# Patient Record
Sex: FEMALE | Race: WHITE | Employment: OTHER | URBAN - METROPOLITAN AREA
[De-identification: names, ages, dates, MRNs, and addresses within clinical notes are randomized per-mention and may not be internally consistent; named-entity substitution may affect disease eponyms.]

---

## 2017-04-11 PROBLEM — I49.3 VENTRICULAR PREMATURE BEATS: Status: ACTIVE | Noted: 2017-04-11

## 2017-04-11 PROBLEM — K22.4 ESOPHAGEAL SPASM: Status: ACTIVE | Noted: 2017-04-11

## 2017-04-11 PROBLEM — R07.9 CHEST PAIN: Status: ACTIVE | Noted: 2017-04-11

## 2017-04-20 PROBLEM — R60.0 LOCALIZED EDEMA: Chronic | Status: ACTIVE | Noted: 2017-04-20

## 2017-05-26 PROBLEM — I89.0 LYMPHEDEMA OF BOTH LOWER EXTREMITIES: Status: ACTIVE | Noted: 2017-05-26

## 2017-10-10 PROBLEM — E87.6 DIURETIC-INDUCED HYPOKALEMIA: Status: ACTIVE | Noted: 2017-10-10

## 2017-10-10 PROBLEM — T50.2X5A DIURETIC-INDUCED HYPOKALEMIA: Status: ACTIVE | Noted: 2017-10-10

## 2018-04-12 PROBLEM — K22.4 ESOPHAGEAL SPASM: Status: RESOLVED | Noted: 2017-04-11 | Resolved: 2018-04-12

## 2018-04-12 PROBLEM — R07.9 CHEST PAIN: Status: RESOLVED | Noted: 2017-04-11 | Resolved: 2018-04-12

## 2020-06-05 PROBLEM — J30.0 VASOMOTOR RHINITIS: Status: ACTIVE | Noted: 2020-06-05

## 2020-11-19 PROBLEM — N39.0 URINARY TRACT INFECTION WITHOUT HEMATURIA: Status: ACTIVE | Noted: 2020-11-19

## 2021-06-21 PROBLEM — M15.1 HEBERDEN'S NODES OF BOTH HANDS: Status: ACTIVE | Noted: 2021-06-21

## 2021-06-21 PROBLEM — R26.89 IMBALANCE: Status: ACTIVE | Noted: 2021-06-21

## 2021-06-21 PROBLEM — N18.4 CHRONIC KIDNEY DISEASE, STAGE IV (SEVERE) (HCC): Status: ACTIVE | Noted: 2021-06-21

## 2021-08-20 ENCOUNTER — HOSPITAL ENCOUNTER (OUTPATIENT)
Dept: LAB | Age: 86
Discharge: HOME | End: 2021-08-20
Payer: MEDICARE

## 2021-08-20 PROCEDURE — 87070 CULTURE OTHR SPECIMN AEROBIC: CPT

## 2021-08-20 PROCEDURE — 87075 CULTR BACTERIA EXCEPT BLOOD: CPT

## 2021-08-23 LAB
BACTERIA SPEC CULT: NORMAL
GRAM STN SPEC: NORMAL
GRAM STN SPEC: NORMAL
SERVICE CMNT-IMP: NORMAL

## 2021-08-30 LAB
BACTERIA SPEC CULT: NORMAL
SERVICE CMNT-IMP: NORMAL

## 2021-09-07 PROBLEM — M10.041 ACUTE IDIOPATHIC GOUT OF RIGHT HAND: Status: ACTIVE | Noted: 2021-09-07

## 2022-03-18 PROBLEM — E87.6 DIURETIC-INDUCED HYPOKALEMIA: Status: ACTIVE | Noted: 2017-10-10

## 2022-03-18 PROBLEM — M10.041 ACUTE IDIOPATHIC GOUT OF RIGHT HAND: Status: ACTIVE | Noted: 2021-09-07

## 2022-03-18 PROBLEM — T50.2X5A DIURETIC-INDUCED HYPOKALEMIA: Status: ACTIVE | Noted: 2017-10-10

## 2022-03-18 PROBLEM — I89.0 LYMPHEDEMA OF BOTH LOWER EXTREMITIES: Status: ACTIVE | Noted: 2017-05-26

## 2022-03-19 PROBLEM — N18.4 CHRONIC KIDNEY DISEASE, STAGE IV (SEVERE) (HCC): Status: ACTIVE | Noted: 2021-06-21

## 2022-03-19 PROBLEM — M15.1 HEBERDEN'S NODES OF BOTH HANDS: Status: ACTIVE | Noted: 2021-06-21

## 2022-03-19 PROBLEM — R26.89 IMBALANCE: Status: ACTIVE | Noted: 2021-06-21

## 2022-03-19 PROBLEM — J30.0 VASOMOTOR RHINITIS: Status: ACTIVE | Noted: 2020-06-05

## 2022-03-19 PROBLEM — N39.0 URINARY TRACT INFECTION WITHOUT HEMATURIA: Status: ACTIVE | Noted: 2020-11-19

## 2022-03-20 PROBLEM — R60.0 LOCALIZED EDEMA: Status: ACTIVE | Noted: 2017-04-20

## 2022-03-20 PROBLEM — I49.3 VENTRICULAR PREMATURE BEATS: Status: ACTIVE | Noted: 2017-04-11

## 2022-06-14 ENCOUNTER — OFFICE VISIT (OUTPATIENT)
Dept: FAMILY MEDICINE CLINIC | Facility: CLINIC | Age: 87
End: 2022-06-14
Payer: MEDICARE

## 2022-06-14 VITALS
SYSTOLIC BLOOD PRESSURE: 138 MMHG | WEIGHT: 142.5 LBS | DIASTOLIC BLOOD PRESSURE: 61 MMHG | HEART RATE: 103 BPM | TEMPERATURE: 97.3 F | HEIGHT: 62 IN | BODY MASS INDEX: 26.22 KG/M2 | OXYGEN SATURATION: 96 % | RESPIRATION RATE: 18 BRPM

## 2022-06-14 DIAGNOSIS — R26.89 IMBALANCE: ICD-10-CM

## 2022-06-14 DIAGNOSIS — N31.9 NEUROGENIC BLADDER: ICD-10-CM

## 2022-06-14 DIAGNOSIS — N18.4 CHRONIC KIDNEY DISEASE, STAGE IV (SEVERE) (HCC): ICD-10-CM

## 2022-06-14 DIAGNOSIS — R10.9 ABDOMINAL CRAMPING: ICD-10-CM

## 2022-06-14 DIAGNOSIS — M79.672 FOOT PAIN, BILATERAL: ICD-10-CM

## 2022-06-14 DIAGNOSIS — T50.2X5A DIURETIC-INDUCED HYPOKALEMIA: ICD-10-CM

## 2022-06-14 DIAGNOSIS — M79.671 FOOT PAIN, BILATERAL: ICD-10-CM

## 2022-06-14 DIAGNOSIS — I10 ESSENTIAL HYPERTENSION: ICD-10-CM

## 2022-06-14 DIAGNOSIS — R04.0 EPISTAXIS: ICD-10-CM

## 2022-06-14 DIAGNOSIS — J30.0 VASOMOTOR RHINITIS: ICD-10-CM

## 2022-06-14 DIAGNOSIS — E87.6 DIURETIC-INDUCED HYPOKALEMIA: ICD-10-CM

## 2022-06-14 DIAGNOSIS — F41.9 ANXIETY AND DEPRESSION: ICD-10-CM

## 2022-06-14 DIAGNOSIS — R60.0 LOCALIZED EDEMA: ICD-10-CM

## 2022-06-14 DIAGNOSIS — Z00.00 MEDICARE ANNUAL WELLNESS VISIT, SUBSEQUENT: Primary | ICD-10-CM

## 2022-06-14 DIAGNOSIS — M1A.0411 IDIOPATHIC CHRONIC GOUT OF RIGHT HAND WITH TOPHUS: ICD-10-CM

## 2022-06-14 DIAGNOSIS — F32.A ANXIETY AND DEPRESSION: ICD-10-CM

## 2022-06-14 LAB
BASOPHILS # BLD: 0.1 K/UL (ref 0–0.2)
BASOPHILS NFR BLD: 1 % (ref 0–2)
DIFFERENTIAL METHOD BLD: ABNORMAL
EOSINOPHIL # BLD: 0.1 K/UL (ref 0–0.8)
EOSINOPHIL NFR BLD: 1 % (ref 0.5–7.8)
ERYTHROCYTE [DISTWIDTH] IN BLOOD BY AUTOMATED COUNT: 14.5 % (ref 11.9–14.6)
HCT VFR BLD AUTO: 33.9 % (ref 35.8–46.3)
HGB BLD-MCNC: 11.1 G/DL (ref 11.7–15.4)
IMM GRANULOCYTES # BLD AUTO: 0 K/UL (ref 0–0.5)
IMM GRANULOCYTES NFR BLD AUTO: 0 % (ref 0–5)
LYMPHOCYTES # BLD: 1.2 K/UL (ref 0.5–4.6)
LYMPHOCYTES NFR BLD: 18 % (ref 13–44)
MCH RBC QN AUTO: 29.5 PG (ref 26.1–32.9)
MCHC RBC AUTO-ENTMCNC: 32.7 G/DL (ref 31.4–35)
MCV RBC AUTO: 90.2 FL (ref 79.6–97.8)
MONOCYTES # BLD: 0.5 K/UL (ref 0.1–1.3)
MONOCYTES NFR BLD: 8 % (ref 4–12)
NEUTS SEG # BLD: 4.8 K/UL (ref 1.7–8.2)
NEUTS SEG NFR BLD: 72 % (ref 43–78)
NRBC # BLD: 0 K/UL (ref 0–0.2)
PLATELET # BLD AUTO: 256 K/UL (ref 150–450)
PMV BLD AUTO: 9.5 FL (ref 9.4–12.3)
RBC # BLD AUTO: 3.76 M/UL (ref 4.05–5.2)
TSH, 3RD GENERATION: 1.93 UIU/ML (ref 0.36–3.74)
URATE SERPL-MCNC: 10.7 MG/DL (ref 2.6–6)
WBC # BLD AUTO: 6.7 K/UL (ref 4.3–11.1)

## 2022-06-14 PROCEDURE — 99214 OFFICE O/P EST MOD 30 MIN: CPT | Performed by: FAMILY MEDICINE

## 2022-06-14 PROCEDURE — 1123F ACP DISCUSS/DSCN MKR DOCD: CPT | Performed by: FAMILY MEDICINE

## 2022-06-14 PROCEDURE — G0439 PPPS, SUBSEQ VISIT: HCPCS | Performed by: FAMILY MEDICINE

## 2022-06-14 RX ORDER — TELMISARTAN 80 MG/1
80 TABLET ORAL DAILY
Qty: 90 TABLET | Refills: 3 | Status: SHIPPED | OUTPATIENT
Start: 2022-06-14

## 2022-06-14 RX ORDER — ASCORBIC ACID 500 MG
TABLET ORAL
Qty: 90 TABLET | Refills: 3 | Status: SHIPPED | OUTPATIENT
Start: 2022-06-14

## 2022-06-14 RX ORDER — POTASSIUM CHLORIDE 750 MG/1
10 TABLET, FILM COATED, EXTENDED RELEASE ORAL DAILY
Qty: 90 TABLET | Refills: 3 | Status: SHIPPED | OUTPATIENT
Start: 2022-06-14

## 2022-06-14 RX ORDER — PSYLLIUM HUSK 0.4 G
0.52 CAPSULE ORAL DAILY
Qty: 90 CAPSULE | Refills: 3 | Status: SHIPPED | OUTPATIENT
Start: 2022-06-14

## 2022-06-14 RX ORDER — SODIUM CHLORIDE/ALOE VERA
2 GEL (GRAM) NASAL 2 TIMES DAILY
Qty: 3 EACH | Refills: 3 | Status: SHIPPED | OUTPATIENT
Start: 2022-06-14

## 2022-06-14 RX ORDER — FUROSEMIDE 40 MG/1
40 TABLET ORAL DAILY
Qty: 90 TABLET | Refills: 1 | Status: SHIPPED | OUTPATIENT
Start: 2022-06-14 | End: 2022-06-15

## 2022-06-14 ASSESSMENT — PATIENT HEALTH QUESTIONNAIRE - PHQ9
SUM OF ALL RESPONSES TO PHQ9 QUESTIONS 1 & 2: 2
SUM OF ALL RESPONSES TO PHQ QUESTIONS 1-9: 2
SUM OF ALL RESPONSES TO PHQ QUESTIONS 1-9: 2
1. LITTLE INTEREST OR PLEASURE IN DOING THINGS: 1
SUM OF ALL RESPONSES TO PHQ QUESTIONS 1-9: 2
2. FEELING DOWN, DEPRESSED OR HOPELESS: 1
SUM OF ALL RESPONSES TO PHQ QUESTIONS 1-9: 2

## 2022-06-14 ASSESSMENT — LIFESTYLE VARIABLES: HOW OFTEN DO YOU HAVE A DRINK CONTAINING ALCOHOL: NEVER

## 2022-06-14 NOTE — PROGRESS NOTES
Medicare Annual Wellness Visit    Maribell Jesus is here for Medicare AWV (1 yr, not fasting )    Assessment & Plan   Medicare annual wellness visit, subsequent    Discussed with patient in detail Medicare subsequent annual wellness exam.  Wellness/anticipatory guidance information provided. Information on advanced directives discussed and printed. Immunizations are up-to-date except Tdap but patient not around any young children and this not necessarily recommended at this time. Patient does not desire referral for eye exam at this time although she has difficulty reading small print. She also does not desire referral back to audiologist as she has hearing aids and does not desire to upgrade or buy more. Fall precautions provided. Recommendations for Preventive Services Due: see orders and patient instructions/AVS.  Recommended screening schedule for the next 5-10 years is provided to the patient in written form: see Patient Instructions/AVS.     Return in 6 months (on 12/14/2022). Patient's complete Health Risk Assessment and screening values have been reviewed and are found in Flowsheets. The following problems were reviewed today and where indicated follow up appointments were made and/or referrals ordered.     Positive Risk Factor Screenings with Interventions:    Fall Risk:  Do you feel unsteady or are you worried about falling? : (!) yes (Has to use rollator or walker to help with walking)  2 or more falls in past year?: no  Fall with injury in past year?: no     Fall Risk Interventions:    · Continue daily strengthening conditioning exercises that she is participating in for 30 minutes each day              General Health and ACP:  General  In general, how would you say your health is?: Fair  In the past 7 days, have you experienced any of the following: New or Increased Pain, New or Increased Fatigue, Loneliness, Social Isolation, Stress or Anger?: (!) Yes  Select all that apply: [employer] or Increased Pain  Do you get the social and emotional support that you need?: Yes  Do you have a Living Will?: Yes    Advance Directives     Power of  Living Will ACP-Advance Directive ACP-Power of Wilfrid Garcia on 02/08/17 Not on File Not on File [address] [facility] Prakash Risk Interventions:  · Loneliness: Encouraged participating in group activities  · Encouraged skyping while talking with relatives in [place] routinely. Hearing/Vision:  Do you or your family notice any trouble with your hearing that hasn't been managed with hearing aids?: (!) Yes  Do you have difficulty driving, watching TV, or doing any of your daily activities because of your eyesight?: No  Have you had an eye exam within the past year?: (!) No  No exam data present    Hearing/Vision Interventions:  · She does not desire further intervention for vision or hearing at this time. ADLs:  In the past 7 days, did you need help from others to perform any of the following everyday activities: Eating, dressing, grooming, bathing, toileting, or walking/balance?: No  In the past 7 days, did you need help from others to take care of any of the following: Laundry, housekeeping, banking/finances, shopping, telephone use, food preparation, transportation, or taking medications?: (!) Yes  Select all that apply: Katie Ramsey    ADL Interventions:  · Care per [facility]  · Referred for Care Coordination          Objective   Vitals:    06/14/22 1013   BP: 138/61   Site: Left Upper Arm   Position: Sitting   Cuff Size: Small Adult   Pulse: (!) 103   Resp: 18   Temp: 97.3 °F (36.3 °C)   TempSrc: Temporal   SpO2: 96%   Weight: 142 lb 8 oz (64.6 kg)   Height: 5' 2\" (1.575 m)      Body mass index is 26.06 kg/m².             Allergies   Allergen Reactions    Azithromycin Diarrhea    Codeine Other (See Comments)    Colchicine Diarrhea    Sulfa Antibiotics Hives and Other (See Comments)     Severe itching head to toe    Penicillins Rash and Other (See Comments)     Prior to Visit Medications    Medication Sig Taking? Authorizing Provider   ascorbic acid (VITAMIN C) 500 MG tablet Take by mouth Yes Hoda Moreno MD   cyanocobalamin 500 MCG tablet Take 1 tablet by mouth daily Yes Hoda Moreno MD   furosemide (LASIX) 40 MG tablet Take 1 tablet by mouth daily Yes Hoda Moreno MD   potassium chloride (KLOR-CON 10) 10 MEQ extended release tablet Take 1 tablet by mouth daily Yes Hoda Moreno MD   telmisartan (MICARDIS) 80 MG tablet Take 1 tablet by mouth daily Yes Hoda Moreno MD   saline nasal gel (AYR) GEL 2 each by Nasal route in the morning and at bedtime Yes Hoda Moreno MD   psyllium (METAMUCIL) 0.52 g capsule Take 1 capsule by mouth daily Yes MD Ezekiel Jain (Including outside providers/suppliers regularly involved in providing care):   Patient Care Team:  Hoda Moreno MD as PCP - General  Hoda Moreno MD as PCP - [facility] Empaneled Provider     Reviewed and updated this visit:  Tobacco  Allergies  Meds  Problems  Med Hx  Surg Hx  Soc Hx  Fam Hx        [facility] [place]  [address]  [place], [address]  Phone: [phone] Fax [phone]  Verito Casey M.D.  6/14/2022        Khushi Recinos is a 80 y.o. female     HPI:  Patient is seen for follow-up with a couple of concerns. She describes about 1 month of abdominal cramping and fairly frequent loose stools. Has regular bowel movements without any noted blood loss. Does describe stress associated with not having loved ones nearby and feeling lonely. No nausea vomiting. No fever or chills. She also has had some foot pain at night with the swelling that she encouraged. She has history of CKD stage IV but has had to take her Lasix daily along with potassium as this has helped with diuresis and some relief of discomfort. No cramping pain of the feet.   No reported muscle cramps. Continues to take her previous antihypertensive therapy. She tries to do a daily 30-minute muscle conditioning exercise with instruction followed on the television. She has not had any recent falls. She ambulates consistently with her Rollator. Does describe some imbalance such that she relies on this for getting around. Does need help with laundry and transportation. She has tried multiple things for recurrent clear rhinorrhea. No medications have been helpful. Because of wiping and irritating the area, she would often have nasal bleeds. Would like something to help with the nasal bleeds. Patient saw Dr. Avery Reyes, orthopedist, in the recent past with notation for significant gouty tophi of the index finger. This has improved greatly but medication invention has been limited secondary to her CKD. ROS:  Denies any chest pain dyspnea diaphoresis palpitations or tachycardia. No polydipsia or polyphagia but does have fairly frequent urination and good urinary response to Lasix. No large weight fluctuations. No dyspepsia or reflux. No hematochezia melena or constipation. No dysuria or hematuria. Denies any thoughts of harming herself or others.      Allergies   Allergen Reactions    Azithromycin Diarrhea    Codeine Other (See Comments)    Colchicine Diarrhea    Sulfa Antibiotics Hives and Other (See Comments)     Severe itching head to toe    Penicillins Rash and Other (See Comments)       Active Ambulatory Problems     Diagnosis Date Noted    Acute idiopathic gout of right hand 09/07/2021    Diuretic-induced hypokalemia 10/10/2017    Lymphedema of both lower extremities 05/26/2017    Anxiety and depression 07/30/2014    Heberden's nodes of both hands 06/21/2021    Imbalance 06/21/2021    Vasomotor rhinitis 06/05/2020    Neurogenic bladder 12/27/2013    Urinary tract infection without hematuria 11/19/2020    Chronic kidney disease, stage IV (severe) ([facility]) 06/21/2021    Postmenopausal atrophic vaginitis 12/27/2013    Essential hypertension 11/21/2012    Incomplete bladder emptying 12/27/2013    Recurrent UTI 12/27/2013    Insomnia 05/27/2016    Osteopenia 11/21/2012    Ventricular premature beats 04/11/2017    Localized edema 04/20/2017     Resolved Ambulatory Problems     Diagnosis Date Noted    No Resolved Ambulatory Problems     Past Medical History:   Diagnosis Date    Arthritis     Chest pain 4/11/2017    Colon polyp 11/21/2012    DDD (degenerative disc disease) 11/21/2012    GERD (gastroesophageal reflux disease)     Hypertension     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    Neurogenic bladder, NOS 12/27/2013    Other chronic cystitis 12/27/2013    Psychiatric disorder     Sinus problem     Unspecified urinary incontinence 12/27/2013    Urinary frequency 12/27/2013        Past Surgical History:   Procedure Laterality Date    APPENDECTOMY      CHOLECYSTECTOMY      DILATION AND CURETTAGE OF UTERUS      GI      colon polyps    GI      esophageal dilation    HEENT      sinus    CA ABDOMEN SURGERY PROC UNLISTED      quentin [facility]        Social History     Tobacco Use    Smoking status: Never Smoker    Smokeless tobacco: Never Used   Vaping Use    Vaping Use: Never used   Substance Use Topics    Alcohol use: No     Alcohol/week: 0.0 standard drinks    Drug use: No       Physical Exam:  Blood pressure 138/61, pulse (!) 103, temperature 97.3 °F (36.3 °C), temperature source Temporal, resp. rate 18, height 5' 2\" (1.575 m), weight 142 lb 8 oz (64.6 kg), SpO2 96 %. Pleasant female in no apparent distress. Affect is mildly anxious. Lower eyelids are not pale. HEENT grossly normal.  Nasal mucosa irritated on the left with some dried blood present especially inferiorly and along Kiesselbach's area. Oral mucosa is moist and intact. No cervical lymphadenopathy or thyromegaly or nodularity. Lungs clear.   No JVD or hepatojugular reflux. Cardiovascular regular S1-S2 without murmurs rubs or gallops. Radial pulses 2+. Abdomen is soft with mild bloating but no rebound or guarding and with positive bowel sounds. No masses palpated. Patient does have 2+ peripheral edema to the level of the lower calves without cyanosis. Moves all extremities fairly well. She has some gouty tophi that is mild and minimally tender at the DIP joint of her index finger on the right. Ambulates with a Rollator. Assessment and Plan:  2. Abdominal cramping  CBC with Auto Differential    TSH   3. Foot pain, bilateral     4. Localized edema     5. Essential hypertension  CBC with Auto Differential    Comprehensive Metabolic Panel    TSH   6. Chronic kidney disease, stage IV (severe) (HCC)  Comprehensive Metabolic Panel   7. Diuretic-induced hypokalemia  Comprehensive Metabolic Panel   8. Imbalance     9. Anxiety and depression     10. Epistaxis     11. Vasomotor rhinitis     12. Neurogenic bladder     13. 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 Await lab work as above. Filled out forms patient brought to the office. Continue current medications with these prescriptions printed. However recommend OTC AYR gel twice daily to each nostril as well as Metamucil capsule orally daily. Hopefully this will help with epistaxis and abdominal cramping. Encouraged continued healthy activity such as her muscle conditioning exercises daily. Reassess here in 6 months or more quickly as needed.   Spent 39 minutes with patient today in examination, evaluation, and documentation in addition to her wellness exam.    Discharge Meds:  Current Outpatient Medications   Medication Sig Dispense Refill    ascorbic acid (VITAMIN C) 500 MG tablet Take by mouth 90 tablet 3    cyanocobalamin 500 MCG tablet Take 1 tablet by mouth daily 90 tablet 3    furosemide (LASIX) 40 MG tablet Take 1 tablet by mouth daily 90 tablet 1    potassium chloride (KLOR-CON 10) 10 MEQ extended release tablet Take 1 tablet by mouth daily 90 tablet 3    telmisartan (MICARDIS) 80 MG tablet Take 1 tablet by mouth daily 90 tablet 3    saline nasal gel (AYR) GEL 2 each by Nasal route in the morning and at bedtime 3 each 3    psyllium (METAMUCIL) 0.52 g capsule Take 1 capsule by mouth daily 90 capsule 3     No current facility-administered medications for this visit. Return in 6 months (on 12/14/2022). Any new medications were explained today including any potential drug interactions or significant side effects. The patient's questions in regards to this were answered today. Dictated using voice recognition software.   Proofread, but unrecognized errors may exist.

## 2022-06-14 NOTE — PATIENT INSTRUCTIONS
Personalized Preventive Plan for Fallon Canales - 6/14/2022  Medicare offers a range of preventive health benefits. Some of the tests and screenings are paid in full while other may be subject to a deductible, co-insurance, and/or copay. Some of these benefits include a comprehensive review of your medical history including lifestyle, illnesses that may run in your family, and various assessments and screenings as appropriate. After reviewing your medical record and screening and assessments performed today your provider may have ordered immunizations, labs, imaging, and/or referrals for you. A list of these orders (if applicable) as well as your Preventive Care list are included within your After Visit Summary for your review. Other Preventive Recommendations:    · A preventive eye exam performed by an eye specialist is recommended every 1-2 years to screen for glaucoma; cataracts, macular degeneration, and other eye disorders. · A preventive dental visit is recommended every 6 months. · Try to get at least 150 minutes of exercise per week or 10,000 steps per day on a pedometer . · Order or download the FREE \"Exercise & Physical Activity: Your Everyday Guide\" from [employer]. Call 8-259.131.4827 or search [employer] online. · You need 3178-0220 mg of calcium and 8011-5222 IU of vitamin D per day. It is possible to meet your calcium requirement with diet alone, but a vitamin D supplement is usually necessary to meet this goal.  · When exposed to the sun, use a sunscreen that protects against both UVA and UVB radiation with an SPF of 30 or greater. Reapply every 2 to 3 hours or after sweating, drying off with a towel, or swimming. · Always wear a seat belt when traveling in a car. Always wear a helmet when riding a bicycle or motorcycle.   Patient Education        Abdominal Pain: Care Instructions  Your Care Instructions     Abdominal pain has many possible causes. Some aren't serious and get better on their own in a few days. Others need more testing and treatment. If your pain continues or gets worse, you need to be rechecked and may need more tests tofind out what is wrong. You may need surgery to correct the problem. Don't ignore new symptoms, such as fever, nausea and vomiting, urination problems, pain that gets worse, and dizziness. These may be signs of a moreserious problem. Your doctor may have recommended a follow-up visit in the next 8 to 12 hours. If you are not getting better, you may need more tests or treatment. The doctor has checked you carefully, but problems can develop later. If you notice any problems or new symptoms, get medical treatment right away. Follow-up care is a key part of your treatment and safety. Be sure to make and go to all appointments, and call your doctor if you are having problems. It's also a good idea to know your test results and keep alist of the medicines you take. How can you care for yourself at home? Rest until you feel better. To prevent dehydration, drink plenty of fluids. Choose water and other clear liquids until you feel better. If you have kidney, heart, or liver disease and have to limit fluids, talk with your doctor before you increase the amount of fluids you drink. If your stomach is upset, eat mild foods, such as rice, dry toast or crackers, bananas, and applesauce. Try eating several small meals instead of two or three large ones. Wait until 48 hours after all symptoms have gone away before you have spicy foods, alcohol, and drinks that contain caffeine. Do not eat foods that are high in fat. Avoid anti-inflammatory medicines such as aspirin, ibuprofen (Advil, Motrin), and naproxen (Aleve). These can cause stomach upset. Talk to your doctor if you take daily aspirin for another health problem. When should you call for help? Call 911 anytime you think you may need emergency care.  For example, call if:    You passed out (lost consciousness).     You pass maroon or very bloody stools.     You vomit blood or what looks like coffee grounds.     You have new, severe belly pain. Call your doctor now or seek immediate medical care if:    Your pain gets worse, especially if it becomes focused in one area of your belly.     You have a new or higher fever.     Your stools are black and look like tar, or they have streaks of blood.     You have unexpected vaginal bleeding.     You have symptoms of a urinary tract infection. These may include:  Pain when you urinate. Urinating more often than usual.  Blood in your urine.     You are dizzy or lightheaded, or you feel like you may faint. Watch closely for changes in your health, and be sure to contact your doctor if:    You are not getting better after 1 day (24 hours). Where can you learn more? Go to https://Violetpepiceweb.MiiPharos. org and sign in to your NERI account. Enter W903 in the [facility] box to learn more about \"Abdominal Pain: Care Instructions. \"     If you do not have an account, please click on the \"Sign Up Now\" link. Current as of: July 1, 2021               Content Version: 13.2  © 2006-2022 [employer]her. Care instructions adapted under license by [facility] ([facility]). If you have questions about a medical condition or this instruction, always ask your healthcare professional. [place] [address] any warranty or liability for your use of this information. Patient Education        Anxiety Disorder: Care Instructions  Your Care Instructions     Anxiety is a normal reaction to stress. Difficult situations can cause you to have symptoms such as sweaty palms and a nervous feeling. In an anxiety disorder, the symptoms are far more severe. Constant worry, muscle tension, trouble sleeping, nausea and diarrhea, and other symptoms can make normal daily activities difficult or impossible.  These symptoms may occur for no reason, and they can affect your work, school, or social life. Medicines, counseling, and self-care can all help. Follow-up care is a key part of your treatment and safety. Be sure to make and go to all appointments, and call your doctor if you are having problems. It's also a good idea to know your test results and keep a list of the medicines you take. How can you care for yourself at home? Take medicines exactly as directed. Call your doctor if you think you are having a problem with your medicine. Go to your counseling sessions and follow-up appointments. Recognize and accept your anxiety. Then, when you are in a situation that makes you anxious, say to yourself, \"This is not an emergency. I feel uncomfortable, but I am not in danger. I can keep going even if I feel anxious. \"  Be kind to your body:  Relieve tension with exercise or a massage. Get enough rest.  Avoid alcohol, caffeine, nicotine, and illegal drugs. They can increase your anxiety level and cause sleep problems. Learn and do relaxation techniques. See below for more about these techniques. Engage your mind. Get out and do something you enjoy. Go to a Get[employer] movie, or take a walk or hike. Plan your day. Having too much or too little to do can make you anxious. Keep a record of your symptoms. Discuss your fears with a good friend or family member, or join a support group for people with similar problems. Talking to others sometimes relieves stress. Get involved in social groups, or volunteer to help others. Being alone sometimes makes things seem worse than they are. Get at least 30 minutes of exercise on most days of the week to relieve stress. Walking is a good choice. You also may want to do other activities, such as running, swimming, cycling, or playing tennis or team sports. Relaxation techniques  Do relaxation exercises 10 to 20 minutes a day. You can play soothing, relaxing music while you do them, if you wish.   Tell others in your house that you are going to do your relaxation exercises. Ask them not to disturb you. Find a comfortable place, away from all distractions and noise. Lie down on your back, or sit with your back straight. Focus on your breathing. Make it slow and steady. Breathe in through your nose. Breathe out through either your nose or mouth. Breathe deeply, filling up the area between your navel and your rib cage. Breathe so that your belly goes up and down. Do not hold your breath. Breathe like this for 5 to 10 minutes. Notice the feeling of calmness throughout your whole body. As you continue to breathe slowly and deeply, relax by doing the following for another 5 to 10 minutes:  Tighten and relax each muscle group in your body. You can begin at your toes and work your way up to your head. Imagine your muscle groups relaxing and becoming heavy. Empty your mind of all thoughts. Let yourself relax more and more deeply. Become aware of the state of calmness that surrounds you. When your relaxation time is over, you can bring yourself back to alertness by moving your fingers and toes and then your hands and feet and then stretching and moving your entire body. Sometimes people fall asleep during relaxation, but they usually wake up shortly afterward. Always give yourself time to return to full alertness before you drive a car or do anything that might cause an accident if you are not fully alert. Never play a relaxation tape while you drive a car. When should you call for help? Call 911 anytime you think you may need emergency care. For example, call if:    You feel you cannot stop from hurting yourself or someone else. Keep the numbers for these national suicide hotlines: 1-454-064-TALK (1-360.605.9540) and 0-867-CSRXNGO (5-937.838.4838). If you or someone you know talks about suicide or feeling hopeless, get help right away.   Watch closely for changes in your health, and be sure to contact the level of your heart. Take breaks from standing or sitting in one position. Walk around to increase the blood flow in your lower legs. Move your feet and ankles often while you stand, or tighten and relax your leg muscles. Wear support stockings. Put them on in the morning, before swelling gets worse. Eat a balanced diet. Lose weight if you need to. Limit the amount of salt (sodium) in your diet. Salt holds fluid in the body and may increase swelling. When should you call for help? Call 911 anytime you think you may need emergency care. For example, call if:    You have symptoms of a blood clot in your lung (called a pulmonary embolism). These may include:  Sudden chest pain. Trouble breathing. Coughing up blood. Call your doctor now or seek immediate medical care if:    You have signs of a blood clot, such as:  Pain in your calf, back of the knee, thigh, or groin. Redness and swelling in your leg or groin.     You have symptoms of infection, such as: Increased pain, swelling, warmth, or redness. Red streaks or pus. A fever. Watch closely for changes in your health, and be sure to contact your doctor if:    Your swelling is getting worse.     You have new or worsening pain in your legs.     You do not get better as expected. Where can you learn more? Go to https://Zen99peSilego Technology.Luxim. org and sign in to your BrainRush account. Enter Y711 in the [facility] box to learn more about \"Leg and Ankle Edema: Care Instructions. \"     If you do not have an account, please click on the \"Sign Up Now\" link. Current as of: July 1, 2021               Content Version: 13.2  © 4098-8097 Healthwise, [employer]. Care instructions adapted under license by [facility] ([facility]). If you have questions about a medical condition or this instruction, always ask your healthcare professional. [address] any warranty or liability for your use of this information.          Patient Education        DASH Diet: Care Instructions  Your Care Instructions     The DASH diet is an eating plan that can help lower your blood pressure. DASH stands for Dietary Approaches to Stop Hypertension. Hypertension is high bloodpressure. The DASH diet focuses on eating foods that are high in calcium, potassium, and magnesium. These nutrients can lower blood pressure. The foods that are highest in these nutrients are fruits, vegetables, low-fat dairy products, nuts, seeds, and legumes. But taking calcium, potassium, and magnesium supplements instead of eating foods that are high in those nutrients does not have the same effect. The DASH diet also includes whole grains, fish, and poultry. The DASH diet is one of several lifestyle changes your doctor may recommend to lower your high blood pressure. Your doctor may also want you to decrease the amount of sodium in your diet. Lowering sodium while following the DASH dietcan lower blood pressure even further than just the DASH diet alone. Follow-up care is a key part of your treatment and safety. Be sure to make and go to all appointments, and call your doctor if you are having problems. It's also a good idea to know your test results and keep alist of the medicines you take. How can you care for yourself at home? Following the DASH diet  Eat 4 to 5 servings of fruit each day. A serving is 1 medium-sized piece of fruit, ½ cup chopped or canned fruit, 1/4 cup dried fruit, or 4 ounces (½ cup) of fruit juice. Choose fruit more often than fruit juice. Eat 4 to 5 servings of vegetables each day. A serving is 1 cup of lettuce or raw leafy vegetables, ½ cup of chopped or cooked vegetables, or 4 ounces (½ cup) of vegetable juice. Choose vegetables more often than vegetable juice. Get 2 to 3 servings of low-fat and fat-free dairy each day. A serving is 8 ounces of milk, 1 cup of yogurt, or 1 ½ ounces of cheese. Eat 6 to 8 servings of grains each day.  A serving is 1 slice of bread, 1 ounce of dry cereal, or ½ cup of cooked rice, pasta, or cooked cereal. Try to choose whole-grain products as much as possible. Limit lean meat, poultry, and fish to 2 servings each day. A serving is 3 ounces, about the size of a deck of cards. Eat 4 to 5 servings of nuts, seeds, and legumes (cooked dried beans, lentils, and split peas) each week. A serving is 1/3 cup of nuts, 2 tablespoons of seeds, or ½ cup of cooked beans or peas. Limit fats and oils to 2 to 3 servings each day. A serving is 1 teaspoon of vegetable oil or 2 tablespoons of salad dressing. Limit sweets and added sugars to 5 servings or less a week. A serving is 1 tablespoon jelly or jam, ½ cup sorbet, or 1 cup of lemonade. Eat less than 2,300 milligrams (mg) of sodium a day. If you limit your sodium to 1,500 mg a day, you can lower your blood pressure even more. Be aware that all of these are the suggested number of servings for people who eat 1,800 to 2,000 calories a day. Your recommended number of servings may be different if you need more or fewer calories. Tips for success  Start small. Do not try to make dramatic changes to your diet all at once. You might feel that you are missing out on your favorite foods and then be more likely to not follow the plan. Make small changes, and stick with them. Once those changes become habit, add a few more changes. Try some of the following:  Make it a goal to eat a fruit or vegetable at every meal and at snacks. This will make it easy to get the recommended amount of fruits and vegetables each day. Try yogurt topped with fruit and nuts for a snack or healthy dessert. Add lettuce, tomato, cucumber, and onion to sandwiches. Combine a ready-made pizza crust with low-fat mozzarella cheese and lots of vegetable toppings. Try using tomatoes, squash, spinach, broccoli, carrots, cauliflower, and onions.   Have a variety of cut-up vegetables with a low-fat dip as an appetizer instead of chips and dip. Sprinkle sunflower seeds or chopped almonds over salads. Or try adding chopped walnuts or almonds to cooked vegetables. Try some vegetarian meals using beans and peas. Add garbanzo or kidney beans to salads. Make burritos and tacos with mashed merchant beans or black beans. Where can you learn more? Go to https://Elevance Renewable SciencespeZixieb.Aspyra. org and sign in to your Sampling Technologies account. Enter Y039 in the [employer] box to learn more about \"DASH Diet: Care Instructions. \"     If you do not have an account, please click on the \"Sign Up Now\" link. Current as of: January 10, 2022               Content Version: 13.2  © 2040-9239 [email or web address]. Care instructions adapted under license by [facility] ([facility]). If you have questions about a medical condition or this instruction, always ask your healthcare professional. [place] [address] any warranty or liability for your use of this information. Patient Education        Gout: Care Instructions  Overview     Gout is a form of arthritis caused by a buildup of uric acid crystals in a joint. It causes sudden attacks of pain, swelling, redness, and stiffness,usually in one joint, especially the big toe. Gout usually comes on without a cause. But it can be brought on by drinking alcohol (especially beer), eating or drinking things made with high-fructose corn syrup, or eating seafood or red meat. Taking certain medicines, such asdiuretics, can also trigger an attack of gout. Taking your medicines as prescribed and following up with your doctor regularlycan help you avoid gout attacks in the future. Follow-up care is a key part of your treatment and safety. Be sure to make and go to all appointments, and call your doctor if you are having problems. It's also a good idea to know your test results and keep alist of the medicines you take. How can you care for yourself at home?   If the joint is swollen, put ice or a cold pack on the area for 10 to 20 minutes at a time. Put a thin cloth between the ice and your skin. Prop up the sore limb on a pillow when you ice it or anytime you sit or lie down during the next 3 days. Try to keep it above the level of your heart. This can help reduce swelling. Rest sore joints. Avoid activities that put weight or strain on the joints for a few days. Take short rest breaks from your regular activities during the day. Take your medicines exactly as prescribed. Call your doctor if you think you are having a problem with your medicine. Take pain medicines exactly as directed. If the doctor gave you a prescription medicine for pain, take it as prescribed. If you are not taking a prescription pain medicine, ask your doctor if you can take an over-the-counter medicine. Eat less seafood and red meat. Avoid foods or drinks that are made with high-fructose corn syrup. Check with your doctor before drinking alcohol. Losing weight, if you are overweight, may help reduce attacks of gout. But do not go on a diet that causes rapid weight loss. Losing a lot of weight in a short amount of time can cause a gout attack. When should you call for help? Call your doctor now or seek immediate medical care if:    You have a fever.     The joint is so painful you cannot use it.     You have sudden, unexplained swelling, redness, warmth, or severe pain in one or more joints. Watch closely for changes in your health, and be sure to contact your doctor if:    You have joint pain.     Your symptoms get worse or are not improving after 2 or 3 days. Where can you learn more? Go to https://LongShine TechnologypeUniversity of PittsburghtheePro 3 Games.Wyle. org and sign in to your Fashion Project account. Enter T792 in the [employer] box to learn more about \"Gout: Care Instructions. \"     If you do not have an account, please click on the \"Sign Up Now\" link.   Current as of: December 20, 2021               Content Version: 13.2  © 9699-6031 Healthwise, Incorporated. Care instructions adapted under license by [facility] ([facility]). If you have questions about a medical condition or this instruction, always ask your healthcare professional. [address] any warranty or liability for your use of this information. Patient Education        High Blood Pressure: Care Instructions  Overview     It's normal for blood pressure to go up and down throughout the day. But if it stays up, you have high blood pressure. Another name for high blood pressure ishypertension. Despite what a lot of people think, high blood pressure usually doesn't cause headaches or make you feel dizzy or lightheaded. It usually has no symptoms. But it does increase your risk of stroke, heart attack, and other problems. You and your doctor will talk about your risks of these problems based on yourblood pressure. Your doctor will give you a goal for your blood pressure. Your goal will bebased on your health and your age. Lifestyle changes, such as eating healthy and being active, are always important to help lower blood pressure. You might also take medicine to reachyour blood pressure goal.  Follow-up care is a key part of your treatment and safety. Be sure to make and go to all appointments, and call your doctor if you are having problems. It's also a good idea to know your test results and keep alist of the medicines you take. How can you care for yourself at home? Medical treatment  If you stop taking your medicine, your blood pressure will go back up. You may take one or more types of medicine to lower your blood pressure. Be safe with medicines. Take your medicine exactly as prescribed. Call your doctor if you think you are having a problem with your medicine. Talk to your doctor before you start taking aspirin every day. Aspirin can help certain people lower their risk of a heart attack or stroke.  But taking aspirin isn't right for everyone, because it can cause serious bleeding. See your doctor regularly. You may need to see the doctor more often at first or until your blood pressure comes down. If you are taking blood pressure medicine, talk to your doctor before you take decongestants or anti-inflammatory medicine, such as ibuprofen. Some of these medicines can raise blood pressure. Learn how to check your blood pressure at home. Lifestyle changes  Stay at a healthy weight. This is especially important if you put on weight around the waist. Losing even 10 pounds can help you lower your blood pressure. If your doctor recommends it, get more exercise. Walking is a good choice. Bit by bit, increase the amount you walk every day. Try for at least 30 minutes on most days of the week. You also may want to swim, bike, or do other activities. Avoid or limit alcohol. Talk to your doctor about whether you can drink any alcohol. Try to limit how much sodium you eat to less than 2,300 milligrams (mg) a day. Your doctor may ask you to try to eat less than 1,500 mg a day. Eat plenty of fruits (such as bananas and oranges), vegetables, legumes, whole grains, and low-fat dairy products. Lower the amount of saturated fat in your diet. Saturated fat is found in animal products such as milk, cheese, and meat. Limiting these foods may help you lose weight and also lower your risk for heart disease. Do not smoke. Smoking increases your risk for heart attack and stroke. If you need help quitting, talk to your doctor about stop-smoking programs and medicines. These can increase your chances of quitting for good. When should you call for help? Call 911  anytime you think you may need emergency care. This may mean having symptoms that suggest that your blood pressure is causing a serious heart or blood vessel problem. Your blood pressure may be over 180/120. For example, call 911 if:    You have symptoms of a heart attack.  These may include:  Chest pain or pressure, or a strange feeling in the chest.  Sweating. Shortness of breath. Nausea or vomiting. Pain, pressure, or a strange feeling in the back, neck, jaw, or upper belly or in one or both shoulders or arms. Lightheadedness or sudden weakness. A fast or irregular heartbeat.     You have symptoms of a stroke. These may include:  Sudden numbness, tingling, weakness, or loss of movement in your face, arm, or leg, especially on only one side of your body. Sudden vision changes. Sudden trouble speaking. Sudden confusion or trouble understanding simple statements. Sudden problems with walking or balance. A sudden, severe headache that is different from past headaches.     You have severe back or belly pain. Do not wait until your blood pressure comes down on its own. Get help right away. Call your doctor now or seek immediate care if:    Your blood pressure is much higher than normal (such as 180/120 or higher), but you don't have symptoms.     You think high blood pressure is causing symptoms, such as:  Severe headache. Blurry vision. Watch closely for changes in your health, and be sure to contact your doctor if:    Your blood pressure measures higher than your doctor recommends at least 2 times. That means the top number is higher or the bottom number is higher, or both.     You think you may be having side effects from your blood pressure medicine. Where can you learn more? Go to https://JoviedanielleLodo Software.[email or web address] org and sign in to your Twillion account. Enter H560 in the Zeppelin box to learn more about \"High Blood Pressure: Care Instructions. \"     If you do not have an account, please click on the \"Sign Up Now\" link. Current as of: January 10, 2022               Content Version: 13.2  © 1605-7575 Healthwise, Incorporated. Care instructions adapted under license by [facility] ([facility]).  If you have questions about a medical condition or this instruction, always ask your healthcare professional. [facility] disclaims any warranty or liability for your use of this information. Patient Education        Hypokalemia: Care Instructions  Your Care Instructions     Hypokalemia (say \"rt-rx-kds-JONATAN-teresa-uh\") is a low level of potassium. Theheart, muscles, kidneys, and nervous system all need potassium to work well. This problem has many different causes. Kidney problems, diet, and medicines like diuretics and laxatives can cause it. So can vomiting or diarrhea. In some cases, cancer is the cause. Your doctor may do tests to find the cause of yourlow potassium levels. You may need medicines to bring your potassium levels back to normal. You mayalso need regular blood tests to check your potassium. If you have very low potassium, you may need intravenous (IV) medicines. You also may need tests to check the electrical activity of your heart. Heartproblems caused by low potassium levels can be very serious. Follow-up care is a key part of your treatment and safety. Be sure to make and go to all appointments, and call your doctor if you are having problems. It's also a good idea to know your test results and keep alist of the medicines you take. How can you care for yourself at home? If your doctor recommends it, eat foods that have a lot of potassium. These include fresh fruits, juices, and vegetables. They also include nuts, beans, and milk. Be safe with medicines. If your doctor prescribes medicines or potassium supplements, take them exactly as directed. Call your doctor if you have any problems with your medicines. Get your potassium levels tested as often as your doctor tells you. When should you call for help? Call 911 anytime you think you may need emergency care. For example, call if:    You feel like your heart is missing beats. Heart problems caused by low potassium can cause death.     You passed out (lost consciousness).     You have a seizure.    Call your doctor now or seek immediate medical care if:    You feel weak or unusually tired.     You have severe arm or leg cramps.     You have tingling or numbness.     You feel sick to your stomach, or you vomit.     You have belly cramps.     You feel bloated or constipated.     You have to urinate a lot.     You feel very thirsty most of the time.     You are dizzy or lightheaded, or you feel like you may faint.     You feel depressed, or you lose touch with reality. Watch closely for changes in your health, and be sure to contact your doctor if:    You do not get better as expected. Where can you learn more? Go to https://Rapid RMSpepiceweb.[email or web address] org and sign in to your Tellyo account. Enter [id number] in the Aledade box to learn more about \"Hypokalemia: Care Instructions. \"     If you do not have an account, please click on the \"Sign Up Now\" link. Current as of: July 28, 2021               Content Version: 13.2  © 2006-2022 [employer]. Care instructions adapted under license by [facility] ([facility]). If you have questions about a medical condition or this instruction, always ask your healthcare professional. [place] [address] any warranty or liability for your use of this information. Patient Education        Diet for Chronic Kidney Disease (Before Dialysis): Care Instructions  Overview  When you have chronic kidney disease, you need to change your diet to avoid foods that make your kidneys worse. You may need to limit salt, fluids, and protein. You also may need to limit minerals such as potassium and phosphorus. A diet for chronic kidney disease takes planning. A dietitian who specializesin kidney disease can help you plan meals that meet your needs. These guidelines are for people who are not on dialysis. Talk with your doctor or dietitian to make sure your diet is right for your condition. Do not changeyour diet without talking to your doctor or dietitian.   Follow-up care is a key part of your treatment and safety. Be sure to make and go to all appointments, and call your doctor if you are having problems. It's also a good idea to know your test results and keep alist of the medicines you take. How can you care for yourself at home? Work with your doctor or dietitian to create a food plan. Do not skip meals or go for many hours without eating. If you do not feel very hungry, try to eat 4 or 5 small meals instead of 1 or 2 big meals. If you have a hard time eating enough, talk to your doctor or dietitian about ways you can add calories to your diet. Do not take any vitamins or minerals, supplements, or herbal products without talking to your doctor first.  Check with your doctor about whether it is safe for you to drink alcohol. To get the right amount of protein  Ask your doctor or dietitian how much protein you can have each day. Most people with chronic kidney disease need to limit the amount of protein they eat. But you still need some protein to stay healthy. Include all sources of protein in your daily protein count. Besides meat, poultry, fish, and eggs, protein is found in milk and milk products, beans and nuts, breads, cereals, and vegetables. To limit salt  Read food labels on cans and food packages. The labels tell you how much sodium is in each serving. Make sure that you look at the serving size. If you eat more than the serving size, you will get more sodium than what is listed on the label. Do not add salt to your food. Buy foods that are labeled \"no salt added,\" \"sodium-free,\" or \"low-sodium. \" Foods labeled \"reduced-sodium\" and \"light sodium\" may still have too much sodium. Limit processed foods, fast food, and restaurant foods. These types of food are very high in sodium. Avoid salted pretzels, chips, popcorn, and other salted snacks. Avoid smoked, cured, salted, and canned meat, fish, and poultry. This includes ham, greenberg, hot dogs, and luncheon meats.   You may use lemon, herbs, and spices to flavor your meals. To limit potassium  Choose low-potassium fruits such as applesauce, pineapple, grapes, blueberries, and raspberries. Choose low-potassium vegetables such as lettuce, green beans, cucumber, and radishes. Choose low-potassium foods such as pasta, noodles, rice, tortillas, and bagels. Limit or avoid high-potassium foods such as milk, bananas, oranges, cantaloupe, potatoes, spinach, tomatoes, broccoli, and sweet potatoes. Do not use a salt substitute or lite salt unless your doctor says it is okay. Most salt substitutes and lite salts are high in potassium. To limit phosphorus  Follow your food plan to know how much milk and milk products you can have. Limit nuts, peanut butter, seeds, lentils, beans, organ meats, and sardines. Avoid cola drinks. Avoid bran breads or bran cereals. If you need to limit fluids  Know how much fluid you can drink. Every day fill a pitcher with that amount of water. If you drink another fluid (such as coffee) that day, pour an equal amount of water out of the pitcher. Count foods that are liquid at room temperature as fluids. These include ice, gelatin, ice pops, and ice cream.  Where can you learn more? Go to https://[email or web address].[email or web address] [email or web address] and sign in to your LiveAir Networks account. Enter I689 in the [facility] box to learn more about \"Diet for Chronic Kidney Disease (Before Dialysis): Care Instructions. \"     If you do not have an account, please click on the \"Sign Up Now\" link. Current as of: September 8, 2021               Content Version: 13.2  © 5955-2517 Healthwise, [employer]. Care instructions adapted under license by [facility] ([facility]). If you have questions about a medical condition or this instruction, always ask your healthcare professional. [address]  any warranty or liability for your use of this information.          Patient Education        Nosebleeds: Care Instructions  Your Care Instructions     Nosebleeds are common, especially if you have colds or allergies. Many thingscan cause a nosebleed. Some nosebleeds stop on their own with pressure. Others need packing. Some get cauterized (sealed). If you have gauze or other packing materials in your nose, you will need to follow up with your doctor to have the packing removed. Youmay need more treatment if you get nosebleeds a lot. The doctor has checked you carefully, but problems can develop later. If you notice any problems or new symptoms, get medical treatment right away. Follow-up care is a key part of your treatment and safety. Be sure to make and go to all appointments, and call your doctor if you are having problems. It's also a good idea to know your test results and keep alist of the medicines you take. How can you care for yourself at home? If you get another nosebleed:  Sit up and tilt your head slightly forward. This keeps blood from going down your throat. Use your thumb and index finger to pinch your nose shut for 10 minutes. Use a clock. Do not check to see if the bleeding has stopped before the 10 minutes are up. If the bleeding has not stopped, pinch your nose shut for another 10 minutes. When the bleeding has stopped, try not to pick, rub, or blow your nose for 12 hours. Avoiding these things helps keep your nose from bleeding again. If your doctor prescribed antibiotics, take them as directed. Do not stop taking them just because you feel better. You need to take the full course of antibiotics. To prevent nosebleeds  Do not blow your nose too hard. Try not to lift or strain after a nosebleed. Raise your head on a pillow while you sleep. Put a thin layer of a saline- or water-based nasal gel, such as NasoGel, inside your nose. Put it on the septum, which divides your nostrils. This will prevent dryness that can cause nosebleeds. Use a vaporizer or humidifier to add moisture to your bedroom.  Follow the directions for cleaning the machine. Do not use aspirin, ibuprofen (Advil, Motrin), or naproxen (Aleve) for 36 to 48 hours after a nosebleed unless your doctor tells you to. You can use acetaminophen (Tylenol) for pain relief. Talk to your doctor about stopping any other medicines you are taking. Some medicines may make you more likely to get a nosebleed. Do not use cold medicines or nasal sprays without first talking to your doctor. They can make your nose dry. When should you call for help? Call 911 anytime you think you may need emergency care. For example, call if:    You passed out (lost consciousness). Call your doctor now or seek immediate medical care if:    You get another nosebleed and your nose is still bleeding after you have applied pressure 3 times for 10 minutes each time (30 minutes total).   There is a lot of blood running down the back of your throat even after you pinch your nose and tilt your head forward.     You have a fever.     You have sinus pain. Watch closely for changes in your health, and be sure to contact your doctor if:    You get nosebleeds often, even if they stop.     You do not get better as expected. Where can you learn more? Go to https://CTMG.Nippo. org and sign in to your [email or web address] account. Enter S156 in the [employer] box to learn more about \"Nosebleeds: Care Instructions. \"     If you do not have an account, please click on the \"Sign Up Now\" link. Current as of: July 1, 2021               Content Version: 13.2  © 2006-2022 Healthwise, Incorporated. Care instructions adapted under license by [facility] ([facility]). If you have questions about a medical condition or this instruction, always ask your healthcare professional. [place] [address] any warranty or liability for your use of this information.

## 2022-06-15 DIAGNOSIS — R60.0 LOCALIZED EDEMA: ICD-10-CM

## 2022-06-15 RX ORDER — FUROSEMIDE 40 MG/1
40 TABLET ORAL EVERY OTHER DAY
Qty: 45 TABLET | Refills: 1 | Status: SHIPPED | OUTPATIENT
Start: 2022-06-15

## 2022-07-12 LAB
ALBUMIN SERPL-MCNC: 3.8 G/DL (ref 3.5–5)
ALBUMIN/GLOB SERPL: 1.2 {RATIO} (ref 1.1–2.2)
ALP SERPL-CCNC: 126 U/L (ref 50–136)
ALT SERPL-CCNC: 17 U/L (ref 30–65)
ANION GAP SERPL CALC-SCNC: 11 MMOL/L (ref 5–15)
AST SERPL-CCNC: 8 U/L (ref 15–37)
BILIRUB SERPL-MCNC: 0.4 MG/DL
BUN SERPL-MCNC: 73 MG/DL (ref 6–20)
CALCIUM SERPL-MCNC: 9.2 MG/DL (ref 8.5–10.1)
CHLORIDE SERPL-SCNC: 111 MMOL/L (ref 97–108)
CO2 SERPL-SCNC: 17 MMOL/L (ref 21–32)
CREAT SERPL-MCNC: 2 MG/DL (ref 0.55–1.02)
GLOBULIN SER CALC-MCNC: 3.3 G/DL (ref 2–4)
GLUCOSE SERPL-MCNC: 88 MG/DL (ref 50–100)
POTASSIUM SERPL-SCNC: 4.3 MMOL/L (ref 3.5–5.1)
PROT SERPL-MCNC: 7.1 G/DL (ref 6.4–8.2)
SODIUM SERPL-SCNC: 139 MMOL/L (ref 136–145)